# Patient Record
Sex: FEMALE | Race: WHITE
[De-identification: names, ages, dates, MRNs, and addresses within clinical notes are randomized per-mention and may not be internally consistent; named-entity substitution may affect disease eponyms.]

---

## 2018-05-26 ENCOUNTER — HOSPITAL ENCOUNTER (EMERGENCY)
Dept: HOSPITAL 17 - PHED | Age: 28
Discharge: HOME | End: 2018-05-26
Payer: MEDICAID

## 2018-05-26 VITALS
RESPIRATION RATE: 18 BRPM | SYSTOLIC BLOOD PRESSURE: 178 MMHG | DIASTOLIC BLOOD PRESSURE: 93 MMHG | OXYGEN SATURATION: 97 % | HEART RATE: 93 BPM | TEMPERATURE: 98.7 F

## 2018-05-26 VITALS — BODY MASS INDEX: 45.99 KG/M2 | WEIGHT: 293 LBS | HEIGHT: 67 IN

## 2018-05-26 VITALS — DIASTOLIC BLOOD PRESSURE: 84 MMHG | TEMPERATURE: 98.3 F | SYSTOLIC BLOOD PRESSURE: 140 MMHG

## 2018-05-26 DIAGNOSIS — J45.909: ICD-10-CM

## 2018-05-26 DIAGNOSIS — I10: ICD-10-CM

## 2018-05-26 DIAGNOSIS — F41.9: ICD-10-CM

## 2018-05-26 DIAGNOSIS — L03.115: Primary | ICD-10-CM

## 2018-05-26 PROCEDURE — 73564 X-RAY EXAM KNEE 4 OR MORE: CPT

## 2018-05-26 PROCEDURE — 99284 EMERGENCY DEPT VISIT MOD MDM: CPT

## 2018-05-26 PROCEDURE — 73590 X-RAY EXAM OF LOWER LEG: CPT

## 2018-05-26 PROCEDURE — 93971 EXTREMITY STUDY: CPT

## 2018-05-26 NOTE — RADRPT
EXAM DATE:  5/26/2018 9:49 PM EDT

AGE/SEX:        27 years / Female



INDICATIONS:  Right leg pain and swelling.



CLINICAL DATA:  This is the patient's initial encounter. Patient reports that signs and symptoms have
 been present for 1 day and indicates a pain score of 3/10. 

                                                                          

MEDICAL/SURGICAL HISTORY:       . Cellulitis.   .



COMPARISON:      [facility], US LEG RIGHT VENOUS DOPPLER, 7/30/2016.  .

TECHNIQUE:  Venous ultrasound of both lower extremities was performed from the inguinal ligament to t
he proximal calf.  Real-time, color Doppler and spectral tracing, compression and augmentation techni
ques were used.  



FINDINGS:  

There is normal compressibility of the deep venous system from the inguinal region to the proximal ca
lf.  No echogenic clot is seen in the lumen of the common femoral, femoral, popliteal, and posterior 
tibial veins.  There is a normal response of the venous system to proximal and distal augmentation an
d respiration.   



CONCLUSION: 

No evidence of DVT.



Electronically signed by: Kyle Conte MD  5/26/2018 9:58 PM EDT

## 2018-05-26 NOTE — RADRPT
EXAM DATE:  5/26/2018 9:10 PM EDT

AGE/SEX:        27 years / Female



INDICATIONS:  Pain and swelling.



CLINICAL DATA:  This is the patient's initial encounter. Patient reports that signs and symptoms have
 been present for 1 month and indicates a pain score of 7/10. 

                                                                          

MEDICAL/SURGICAL HISTORY:       . MRSA  None.



COMPARISON:      No prior [facility] exams available for comparison.



FINDINGS:  

Bony structures are intact and in normal alignment. Osseous density is normal. Mild soft tissue swell
ing is noted..  No radiopaque foreign bodies seen.   



CONCLUSION: 

Mild soft tissue swelling without evidence of acute bony abnormality.









Electronically signed by: Kyle Conte MD  5/26/2018 9:13 PM EDT

## 2018-05-26 NOTE — PD
HPI


Chief Complaint:  Edema


Time Seen by Provider:  20:30


Travel History


International Travel<30 days:  No


Contact w/Intl Traveler<30days:  No


Traveled to known affect area:  No





History of Present Illness


HPI


This is a 27-year-old female who presents to the emergency department with pain 

in her right knee radiating down the right leg, worse with walking, improved 

with rest associated with throbbing in the calf, constant, moderate severity 

with no associated numbness or weakness.  She had a fall earlier this year and 

scraped the front of her leg.  She ended up with staph infection in her foot 

and was seeing a podiatrist and was getting antibiotic therapy but never 

completed it because her insurance ran out.  She denies any current fevers or 

chills and the wound on her foot healed well.  She has no hardware in the leg.  

She is not on OCPs.





PFSH


Past Medical History


Asthma:  Yes


Autoimmune Disease:  No


Blood Disorders:  No


Anxiety:  Yes


Depression:  No


Cardiovascular Problems:  No


Diminished Hearing:  No


Gastrointestinal Disorders:  No


Genitourinary:  No


Hypertension:  Yes


Musculoskeletal:  No


Neurologic:  No


Psychiatric:  No


Respiratory:  Yes (ASTHMA )


Integumentary:  Yes (FREQUENT SKIN ISSUES SINCE EARLY )


Immunizations Current:  Yes


Sickle Cell Disease:  No


Pregnant?:  Not Pregnant


LMP:  -18


:  3


Para:  3


Tubal Ligation:  Yes





Past Surgical History


 Section:  Yes (X3)


Other Surgery:  No





Social History


Alcohol Use:  No (DENIES)


Tobacco Use:  No (1/2 PPD)


Substance Use:  No





Allergies-Medications


(Allergen,Severity, Reaction):  


Coded Allergies:  


     No Known Allergies (Verified  Adverse Reaction, Unknown, 18)


Reported Meds & Prescriptions





Reported Meds & Active Scripts


Active


Percocet (Oxycodone-Acetaminophen) 5-325 mg Tab 1 Tab PO Q6H PRN


Keflex (Cephalexin) 500 Mg Cap 500 Mg PO Q8H








Review of Systems


Except as stated in HPI:  all other systems reviewed are Neg





Physical Exam


Narrative


GENERAL:obese, no acute distress


SKIN: Focused skin assessment warm and dry.


HEAD: Atraumatic. Normocephalic. 


EYES: Pupils equal and round.  No injection or drainage. 


ENT:  Moist mucous membranes


NECK: Trachea midline. 


CARDIOVASCULAR: Regular rate and rhythm.  No murmur appreciated.  2+ right DP 

pulse with normal capillary refill.


RESPIRATORY: Clear to auscultation. Breath sounds equal bilaterally. 


GASTROINTESTINAL: Abdomen soft, non-tender, nondistended. 


MUSCULOSKELETAL: Warmth of the right knee anteriorly with some warmth along the 

proximal anterior tibia, pain with compression of the right calf.  No warmth or 

erythema of the foot or ankle adjacent to the patient's old scars.


NEUROLOGICAL: Awake and alert. No obvious cranial nerve deficits.   Moving all 

extremities.


PSYCHIATRIC: Appropriate mood and affect; insight and judgment normal.





Data


Data


Last Documented VS





Vital Signs








  Date Time  Temp Pulse Resp B/P (MAP) Pulse Ox O2 Delivery O2 Flow Rate FiO2


 


18 20:22 98.7 93 18 178/93 (121) 97   








Orders





 Orders


Knee, Complete (4vws) (18 )


Tibia/Fibula (Ap/Lat) (18 )


Us Leg Venous Doppler (18 )








MDM


Medical Decision Making


Medical Screen Exam Complete:  Yes


Emergency Medical Condition:  Yes


Interpretation(s)


Afebrile, no tachycardia





Last 24 hours Impressions








Tibia/Fibula X-Ray 18 0000 Signed





Impressions: 





 CONCLUSION: 





 Mild soft tissue swelling without evidence of acute bony abnormality.





  





 





  





 





  





 





  





 


 


Lower Extremity Ultrasound 18 0000 Signed





Impressions: 





 CONCLUSION: 





 No evidence of DVT.





  





 


 


Knee X-Ray 18 0000 Signed





Impressions: 





 CONCLUSION: 





 No evidence of acute process.





  





 





  





  





 





  





 





  





 








Differential Diagnosis


Osteoarthritis, bursitis, septic arthritis, cellulitis, DVT


Narrative Course


This is a 27-year-old female who presents to the emergency department with some 

pain and warmth involving the right knee and anterior tibia.  There is some 

soft tissue swelling on x-ray anterior to the tibia.  She has full passive 

range of motion of the right knee with minimal pain so I doubt septic 

arthritis.  She is afebrile.  I think the patient may have a mild cellulitis.  

She will be discharged on antibiotics and will follow up with her primary care 

physician.





Diagnosis





 Primary Impression:  


 Cellulitis


 Qualified Codes:  L03.115 - Cellulitis of right lower limb


Patient Instructions:  General Instructions





***Additional Instructions:  


If you develop fever, increasing redness, warmth, or spreading of your infection

, or severe pain return to the emergency department immediately as you may 

require antibiotics through your IV.





Complete your course of antibiotics as prescribed.


***Med/Other Pt SpecificInfo:  Prescription(s) given


Scripts


Sulfamethoxazole-Trimethoprim (Bactrim DS) 800-160 Mg Tab


1 TAB PO BID for Infection, #20 TAB 0 Refills


   Prov: June Meza MD         18 


Cephalexin (Keflex) 500 Mg Cap


500 MG PO Q12H for Infection for 10 Days, #20 CAP 0 Refills


   Prov: June Meza MD         18


Disposition:  01 DISCHARGE HOME


Condition:  Stable











June Meza MD May 26, 2018 20:45

## 2018-05-26 NOTE — RADRPT
EXAM DATE:  5/26/2018 9:08 PM EDT

AGE/SEX:        27 years / Female



INDICATIONS:  Pain and swelling.



CLINICAL DATA:  This is the patient's initial encounter. Patient reports that signs and symptoms have
 been present for 1 month and indicates a pain score of 7/10. 

                                                                          

MEDICAL/SURGICAL HISTORY:       . MRSA.  None.



COMPARISON:      No prior [facility] exams available for comparison.



FINDINGS:  

Bony structures are intact and in normal alignment.  Joints are intact without dislocation or signifi
cant arthropathy.  Osseous density is normal.  Soft tissues are unremarkable.  No radiopaque foreign 
bodies seen.  



CONCLUSION: 

No evidence of acute process.



 







Electronically signed by: Kyle Conte MD  5/26/2018 9:12 PM EDT